# Patient Record
Sex: MALE | Race: WHITE
[De-identification: names, ages, dates, MRNs, and addresses within clinical notes are randomized per-mention and may not be internally consistent; named-entity substitution may affect disease eponyms.]

---

## 2017-03-20 ENCOUNTER — HOSPITAL ENCOUNTER (EMERGENCY)
Dept: HOSPITAL 45 - C.EDB | Age: 77
Discharge: HOME | End: 2017-03-20
Payer: COMMERCIAL

## 2017-03-20 VITALS — OXYGEN SATURATION: 97 % | HEART RATE: 76 BPM | DIASTOLIC BLOOD PRESSURE: 85 MMHG | SYSTOLIC BLOOD PRESSURE: 132 MMHG

## 2017-03-20 VITALS
HEIGHT: 70 IN | BODY MASS INDEX: 23.42 KG/M2 | BODY MASS INDEX: 23.42 KG/M2 | WEIGHT: 163.58 LBS | WEIGHT: 163.58 LBS | HEIGHT: 70 IN

## 2017-03-20 VITALS — TEMPERATURE: 97.52 F

## 2017-03-20 DIAGNOSIS — I10: ICD-10-CM

## 2017-03-20 DIAGNOSIS — Z79.899: ICD-10-CM

## 2017-03-20 DIAGNOSIS — T36.0X5A: ICD-10-CM

## 2017-03-20 DIAGNOSIS — Z79.82: ICD-10-CM

## 2017-03-20 DIAGNOSIS — L50.0: Primary | ICD-10-CM

## 2017-03-20 DIAGNOSIS — Z88.0: ICD-10-CM

## 2017-03-20 DIAGNOSIS — N40.0: ICD-10-CM

## 2017-03-20 NOTE — EMERGENCY ROOM VISIT NOTE
History


Report prepared by Shamika:  Juno Milan


Under the Supervision of:  Dr. Richi Finney M.D.


First contact with patient:  07:40


Chief Complaint:  RASH


Stated Complaint:  RASH





History of Present Illness


The patient is a 76 year old male who presents to the Emergency Room with 

complaints of a persistent global rash. The patient has been taking Amoxicillin 

500 mg TID for 9 days for a possible tooth infection. The patient developed a 

sudden onset itchy erythematous rash. The rash covers his whole body. The 

patient is not having trouble swallowing or breathing. He has never had a 

reaction like this before. The patient denies any blisters in the mouth, facial 

or tongue swelling. The patient denies any recent illness other than the tooth 

infection. He is on Lisinopril. The patient also has an enlarged prostate. He 

follows up with Dr. Brown.





   Source of History:  patient


   Position:  other (global)


   Symptom Intensity:  covers whole body


   Quality:  other (itchy erythematous rash)


   Timing:  other (persistent)


Note:


Denies facial swelling or trouble breathing.





Review of Systems


See HPI for pertinent positives & negatives. A total of 10 systems reviewed and 

were otherwise negative.





Past Medical & Surgical


Medical Problems:


(1) BPH (benign prostatic hypertrophy)


(2) HTN (hypertension)








Family History





No pertinent family history





Social History


Smoking Status:  Never Smoker


Housing Status:  lives with family





Current/Historical Medications


Scheduled


Alfuzosin Hcl (Uroxatral), 10 MG PO DAILY


Aspirin (Aspir-Low), 1 TAB PO DAILY


Hydrochlorothiazide (Hydrochlorothiazide), 50 MG PO DAILY


Lisinopril (Zestril), 40 MG PO DAILY


Magnesium (Magnesium), 500 MG PO DAILY


Prednisone (Prednisone Tab), 0 PO DAILY


Ranitidine Hcl (Zantac), 150 MG PO BID





Allergies


Coded Allergies:  


     Amoxicillin (Verified  Allergy, Intermediate, rash, 3/20/17)





Physical Exam


Vital Signs











  Date Time  Temp Pulse Resp B/P Pulse Ox O2 Delivery O2 Flow Rate FiO2


 


3/20/17 09:33  76 17 132/85 97 Room Air  


 


3/20/17 08:25  77 18  95 Room Air  


 


3/20/17 07:34 36.4 74 18 162/82 95   











Physical Exam


GENERAL: Patient is a healthy-appearing well-nourished


SKIN: Diffuse urticarial macular rash that does not involve palms or soles of 

feet. Normal mucosal membranes.


HEAD: Normocephalic atraumatic


EYES: Ocular movements intact pupils equal and react to light


OROPHARYNX mucous membranes are moist no exudates present no erythema or edema 

present


NECK: Supple no nuchal rigidity


CHEST: Good equal expansion


LUNGS: Clear and equal to auscultation


CARDIAC: Normal S1 and S2


ABDOMEN: Soft nontender no guarding


BACK: No CVA tenderness


EXTREMITIES: No pain upon palpation normal muscle strength in all groups no 

clubbing cyanosis or edema


NEURO: Patient is following commands is answering questions appropriately. 

Alert and oriented x3 Cranial Nerves 2-12 grossly intact





Medical Decision & Procedures


Medications Administered











 Medications


  (Trade)  Dose


 Ordered  Sig/Dominga


 Route  Start Time


 Stop Time Status Last Admin


Dose Admin


 


 Sodium Chloride


  (Nss 1000ml)  1,000 ml @ 


 999 mls/hr  Q1H1M STAT


 IV  3/20/17 08:02


 3/20/17 09:02 DC 3/20/17 08:16


999 MLS/HR


 


 Methylprednisolone


 Sodium Succinate


  (Solu-Medrol IV)  125 mg  NOW  STAT


 IV  3/20/17 08:02


 3/20/17 08:04 DC 3/20/17 08:16


125 MG


 


 Ranitidine HCl


  (zANTac TAB)  150 mg  NOW  STAT


 PO  3/20/17 08:02


 3/20/17 08:04 DC 3/20/17 08:16


150 MG











ED Course


0745: The patient was evaluated by the [facility].





0800: Past medical records reviewed. The patient was evaluated in room A3. A 

complete history and physical examination was performed. 





0802: Zantac 150 mg PO, Solu-Medrol 125 mg IV, NSS 1000 ml @ 999 mls/hr.





0940: The patient was feeling better and would like to go home.





Medical Decision


Differential diagnosis:


Etiologies such as allergic reaction, anaphylaxis, urticaria, Weinstein-Cristi 

syndrome, toxic epidermal necrolysis, erythema multiforme, cellulitis, as well 

as others were entertained.





This is a 76-year-old male who presents emergency department complaining of 

diffuse urticarial rash that developed after taking amoxicillin.  An IV was 

established, the patient given normal saline bolus, Solu-Medrol and Zantac.  I 

did recommend Benadryl to the patient however he has had issues with prosthetic 

hyperplasia the past and has had urinary retention.  For this reason he is 

refusing Benadryl.  Repeat examination revealed improvement patient's symptoms.

  I do believe that the patient as well as he can be discharged home for follow-

up this primary care physician.  Patient was in agreement with the treatment 

plan.





Impression





 Primary Impression:  


 Allergic reaction





Scribe Attestation


The scribe's documentation has been prepared under my direction and personally 

reviewed by me in its entirety. I confirm that the note above accurately 

reflects all work, treatment, procedures, and medical decision making performed 

by me.





Departure Information


Dispostion


Home / Self-Care





Prescriptions





Ranitidine Hcl (ZANTAC) 150 Mg Tab


150 MG PO BID for 7 Days, #14 TAB


   Prov: Richi Finney MD         3/20/17 


Prednisone (Prednisone Tab) 20 Mg Tab


0 PO DAILY, #7 TAB


   2 TABS DAILY FOR 2 DAYS, THEN 1 TAB DAILY FOR 2 DAYS, THEN 1/2 TAB


   DAILY FOR 2 DAYS.


   Prov: Richi Finney MD         3/20/17





Referrals


Kael Brown M.D. (PCP)





Forms


HOME CARE DOCUMENTATION FORM,                                                 

               IMPORTANT VISIT INFORMATION, WORK / SCHOOL INSTRUCTIONS





Patient Instructions


ED Allergic Reaction General Other, My [facility]





Additional Instructions














You have been examined and treated today on an emergency basis only. This is 

not a substitute for, or an effort to provide, complete comprehensive medical 

care. It is impossible to recognize and treat all injuries or illnesses in a 

single emergency department visit. It is therefore important that you follow up 

closely with Dr Brown.  Call as soon as possible for an appointment.  





Thank you for your time and consideration.  I look forward to speaking with you 

again soon.  Please don't hesitate to call us if you have any questions.





Problem Qualifiers








 Primary Impression:  


 Allergic reaction


 Encounter type:  initial encounter  Qualified Codes:  T78.40XA - Allergy, 

unspecified, initial encounter

## 2017-04-04 ENCOUNTER — HOSPITAL ENCOUNTER (OUTPATIENT)
Dept: HOSPITAL 45 - C.LAB | Age: 77
Discharge: HOME | End: 2017-04-04
Attending: UROLOGY
Payer: COMMERCIAL

## 2017-04-04 DIAGNOSIS — N40.1: Primary | ICD-10-CM

## 2017-04-04 LAB
PSA FREE MFR SERPL: 25.9 %
PSA FREE SERPL-MCNC: 1.64 NG/ML
PSA SERPL-MCNC: 6.34 NG/ML (ref 0–4)

## 2017-09-27 ENCOUNTER — HOSPITAL ENCOUNTER (OUTPATIENT)
Dept: HOSPITAL 45 - C.LAB | Age: 77
Discharge: HOME | End: 2017-09-27
Attending: UROLOGY
Payer: COMMERCIAL

## 2017-09-27 DIAGNOSIS — N40.1: Primary | ICD-10-CM

## 2017-11-09 ENCOUNTER — HOSPITAL ENCOUNTER (OUTPATIENT)
Dept: HOSPITAL 45 - C.MRIBC | Age: 77
Discharge: HOME | End: 2017-11-09
Attending: UROLOGY
Payer: COMMERCIAL

## 2017-11-09 DIAGNOSIS — N40.0: ICD-10-CM

## 2017-11-09 DIAGNOSIS — R97.20: Primary | ICD-10-CM

## 2017-11-09 NOTE — DIAGNOSTIC IMAGING REPORT
PROSTATE MRI COMBO



CLINICAL HISTORY: 77 years-old Male presenting with R97.20 Elevated prostate

specific antigen (PSA)PSA 7.10 9/27/17. PSA 7.1 ng/mL. 



TECHNIQUE: Multisequence, multiplanar MR imaging of the prostate was performed

before and after the administration of intravenous contrast. Additional

postprocessing was performed on a separate [email or web address] workstation by the

radiologist for 3-D volumetric segmentation of the prostate and contouring of

region(s) of interest (YUAN) for targeting. IV contrast: 7 cc of Gadavist 



COMPARISON: Abdomen and pelvis CT 4/4/2014.



FINDINGS:



Prostate: The prostate measures 5.5 x 6.5 x 5.2 cm. cm (Web and RankaCAD prostate

boundary segmentation volume 88 cc mL). Severe changes of benign prostatic

hyperplasia. Precontrast T1 weighted imaging demonstrates no evidence of

intrinsic T1 hyperintensity to suggest hemorrhage. There is an atrophic left

seminal vesicle in comparison to the right. No suspicious lesion is apparent in

the transition or peripheral zones.





Bladder: Bladder wall thickening likely indicating chronic outlet obstruction.

There is bladder wall trabeculation and a few small diverticula.



Bowel: Visualized portion of the rectum normal.



Peritoneum: No free fluid in the pelvis.



Lymph nodes: No lymphadenopathy in the visualized portion of the pelvis.



Vasculature: Iliac vessels patent.



Osseous structures: Normal bone marrow signal intensity for age.



IMPRESSION:

1. No suspicious lesions identified. 



2. Benign prostatic hypertrophy.







Electronically signed by:  Christian Brown M.D.

11/10/2017 2:03 PM



Dictated Date/Time:  11/9/2017 11:06 AM

## 2018-03-26 ENCOUNTER — HOSPITAL ENCOUNTER (OUTPATIENT)
Dept: HOSPITAL 45 - C.LAB | Age: 78
Discharge: HOME | End: 2018-03-26
Attending: UROLOGY
Payer: COMMERCIAL

## 2018-03-26 DIAGNOSIS — R97.20: Primary | ICD-10-CM

## 2018-05-08 ENCOUNTER — HOSPITAL ENCOUNTER (OUTPATIENT)
Dept: HOSPITAL 45 - C.LAB | Age: 78
Discharge: HOME | End: 2018-05-08
Attending: INTERNAL MEDICINE
Payer: COMMERCIAL

## 2018-05-08 DIAGNOSIS — R73.9: Primary | ICD-10-CM

## 2018-05-08 LAB
ALBUMIN SERPL-MCNC: 3.8 GM/DL (ref 3.4–5)
ALP SERPL-CCNC: 79 U/L (ref 45–117)
ALT SERPL-CCNC: 26 U/L (ref 12–78)
AST SERPL-CCNC: 20 U/L (ref 15–37)
BUN SERPL-MCNC: 29 MG/DL (ref 7–18)
CALCIUM SERPL-MCNC: 8.9 MG/DL (ref 8.5–10.1)
CO2 SERPL-SCNC: 29 MMOL/L (ref 21–32)
CREAT SERPL-MCNC: 1.14 MG/DL (ref 0.6–1.4)
GLUCOSE SERPL-MCNC: 93 MG/DL (ref 70–99)
HBA1C MFR BLD: 5.1 % (ref 4.5–5.6)
KETONES UR QL STRIP: 41 MG/DL
PH UR: 140 MG/DL (ref 0–200)
POTASSIUM SERPL-SCNC: 4 MMOL/L (ref 3.5–5.1)
PROT SERPL-MCNC: 6.7 GM/DL (ref 6.4–8.2)
SODIUM SERPL-SCNC: 138 MMOL/L (ref 136–145)